# Patient Record
(demographics unavailable — no encounter records)

---

## 2025-03-11 NOTE — PHYSICAL EXAM
[Midline] : trachea located in midline position [Normal] : palpation of lymph nodes is normal [de-identified] : Some asymmetric platysmal banding, clavicular heads without masses

## 2025-03-11 NOTE — HISTORY OF PRESENT ILLNESS
[de-identified] : 70M presenting with hard lumps at the heads of each clavicle.  Patient notes that 1.5 weeks ago he noticed some asymmetry in the mirror and felt two hard lumps on the head of each clavicle. The left one is hard and the right one is softer. He is asymptomatic otherwise but is concerned about cancer.  [FreeTextEntry1] : Returns today saying they have enlarged. He has neck pain and feels its is getting harder to lift his head.

## 2025-03-11 NOTE — ASSESSMENT
[FreeTextEntry1] : 70M with subjective swelling at the clavicular heads, incidentally found to have a left thyroid TR5 nodule on US. FNA performed today. - Reassured patient that clavicles are normal on US and on palpation - The nodule in the left thyroid mid-pole has concerning features. Biopsy was performed in clinic today - TSH w Reflex T4 ordered - We had a prolonged discussion about ddx of thyroid nodules and possible treatments today.  - Plan to call with results.   Francine Ram MD  I have spent 35 minutes on this patient encounter reviewing records, reviewing relevant labs and imaging, documenting and counseling the patient on their diagnosis and treatment plan. This does not include time spent on any procedures.